# Patient Record
Sex: MALE | Race: WHITE | URBAN - METROPOLITAN AREA
[De-identification: names, ages, dates, MRNs, and addresses within clinical notes are randomized per-mention and may not be internally consistent; named-entity substitution may affect disease eponyms.]

---

## 2017-03-17 ENCOUNTER — EMERGENCY (EMERGENCY)
Facility: HOSPITAL | Age: 37
LOS: 0 days | Discharge: HOME | End: 2017-03-17
Attending: EMERGENCY MEDICINE | Admitting: EMERGENCY MEDICINE
Payer: SELF-PAY

## 2017-03-17 VITALS
SYSTOLIC BLOOD PRESSURE: 134 MMHG | OXYGEN SATURATION: 100 % | HEART RATE: 114 BPM | DIASTOLIC BLOOD PRESSURE: 94 MMHG | HEIGHT: 75 IN | WEIGHT: 139.99 LBS | TEMPERATURE: 98 F

## 2017-03-17 VITALS — HEART RATE: 105 BPM

## 2017-03-17 DIAGNOSIS — K02.9 DENTAL CARIES, UNSPECIFIED: ICD-10-CM

## 2017-03-17 DIAGNOSIS — K08.9 DISORDER OF TEETH AND SUPPORTING STRUCTURES, UNSPECIFIED: ICD-10-CM

## 2017-03-17 PROCEDURE — 99283 EMERGENCY DEPT VISIT LOW MDM: CPT

## 2017-03-17 RX ORDER — PENICILLIN V POTASIUM 500 MG/1
1 TABLET OROPHARYNGEAL
Qty: 28 | Refills: 0
Start: 2017-03-17 | End: 2017-03-24

## 2017-03-17 RX ORDER — OXYCODONE AND ACETAMINOPHEN 5; 325 MG/1; MG/1
1 TABLET ORAL
Qty: 15 | Refills: 0
Start: 2017-03-17

## 2017-03-17 RX ORDER — IBUPROFEN 200 MG
600 TABLET ORAL ONCE
Qty: 0 | Refills: 0 | Status: COMPLETED | OUTPATIENT
Start: 2017-03-17 | End: 2017-03-17

## 2017-03-17 RX ORDER — PENICILLIN V POTASSIUM 250 MG
500 TABLET ORAL ONCE
Qty: 0 | Refills: 0 | Status: COMPLETED | OUTPATIENT
Start: 2017-03-17 | End: 2017-03-17

## 2017-03-17 RX ADMIN — Medication 600 MILLIGRAM(S): at 01:57

## 2017-03-17 RX ADMIN — Medication 500 MILLIGRAM(S): at 01:57

## 2017-03-17 NOTE — ED PROVIDER NOTE - ENMT, MLM
Airway patent, Nasal mucosa clear. Mouth with normal mucosa. Throat has no vesicles, no oropharyngeal exudates and uvula is midline. Left lower 1 molar + cavity, no gum tenderness, no drainage

## 2017-03-17 NOTE — ED PROVIDER NOTE - OBJECTIVE STATEMENT
35 y/o male presents to ED with c/o mouth/dental pain began today.  Pt has history of poor dentition.  He states he was due for root canal however did not obtain over insurance issues. Today presents with left lower jaw pain and swelling.   Denies fever, HA.

## 2017-03-17 NOTE — ED PROVIDER NOTE - NS ED MD SCRIBE ATTENDING SCRIBE SECTIONS
SCALES/DISPOSITION/PAST MEDICAL/SURGICAL/SOCIAL HISTORY/PROVIDER CARE INITIATION/PHYSICAL EXAM/PROGRESS NOTE/OBSERVATION MONITORING PLAN/CONSULTATIONS/SHIFT CHANGE/VITAL SIGNS( Pullset)/HISTORY OF PRESENT ILLNESS/REVIEW OF SYSTEMS/RESULTS/MANDATORY DOCUMENTATION/INTAKE ASSESSMENT/SCREENINGS/HIV

## 2017-10-02 NOTE — ED ADULT NURSE NOTE - CAS EDN DISCHARGE INTERVENTIONS
Was the patient seen in the last year in this department? Yes     Does patient have an active prescription for medications requested? No     Received Request Via: Patient     NA

## 2019-02-15 NOTE — ED PROVIDER NOTE - NSCAREINITIATED _GEN_ER
Lidya Rios(Attending)
Purpose of the nutrition education/Pt was educated on increased needs post-op. Discussed optimal nutrient dense foods high in protein. Pt was receptive and expressed understanding.

## 2020-04-06 ENCOUNTER — TRANSCRIPTION ENCOUNTER (OUTPATIENT)
Age: 40
End: 2020-04-06

## 2020-04-30 ENCOUNTER — MESSAGE (OUTPATIENT)
Age: 40
End: 2020-04-30

## 2020-05-06 PROBLEM — Z00.00 ENCOUNTER FOR PREVENTIVE HEALTH EXAMINATION: Status: ACTIVE | Noted: 2020-05-06

## 2022-04-04 NOTE — ED PROVIDER NOTE - DISCHARGE DATE
17-Mar-2017
Risk Assessment Explanation (Does Not Render In The Note): Clinical determination of the probability and/or consequences of an event, such as surgery. Clinical assessment of the level of risk is affected by the nature of the event under consideration for the patient. Modifier 57 is used to indicate an Evaluation and Management (E/M) service resulted in the initial decision to perform surgery either the day before a major surgery (90 day global) or the day of a major surgery.
Identified Risk Factors Documented?: yes
Complexity (Necessary For Coding; Major - 90 Day Global With Some Exceptions; Minor - 10 Day Global): major
Date Of Surgery - Today Or Tomorrow?: today
Discussion: Risks, benefits and alternative treatment options discussed with patient as outlined below:\\n\\n

## 2024-06-11 ENCOUNTER — EMERGENCY (EMERGENCY)
Facility: HOSPITAL | Age: 44
LOS: 0 days | Discharge: HOME | End: 2024-06-11
Attending: EMERGENCY MEDICINE
Payer: COMMERCIAL

## 2024-06-11 VITALS
TEMPERATURE: 98 F | HEART RATE: 65 BPM | DIASTOLIC BLOOD PRESSURE: 89 MMHG | OXYGEN SATURATION: 97 % | SYSTOLIC BLOOD PRESSURE: 121 MMHG | RESPIRATION RATE: 16 BRPM | WEIGHT: 179.24 LBS

## 2024-06-11 VITALS
RESPIRATION RATE: 18 BRPM | DIASTOLIC BLOOD PRESSURE: 69 MMHG | OXYGEN SATURATION: 97 % | HEART RATE: 63 BPM | SYSTOLIC BLOOD PRESSURE: 112 MMHG | TEMPERATURE: 98 F

## 2024-06-11 DIAGNOSIS — M54.50 LOW BACK PAIN, UNSPECIFIED: ICD-10-CM

## 2024-06-11 DIAGNOSIS — M51.26 OTHER INTERVERTEBRAL DISC DISPLACEMENT, LUMBAR REGION: ICD-10-CM

## 2024-06-11 DIAGNOSIS — F17.200 NICOTINE DEPENDENCE, UNSPECIFIED, UNCOMPLICATED: ICD-10-CM
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
## 2024-06-11 DIAGNOSIS — S39.012A STRAIN OF MUSCLE, FASCIA AND TENDON OF LOWER BACK, INITIAL ENCOUNTER: ICD-10-CM

## 2024-06-11 PROCEDURE — 72148 MRI LUMBAR SPINE W/O DYE: CPT | Mod: 26,MC

## 2024-06-11 PROCEDURE — 99284 EMERGENCY DEPT VISIT MOD MDM: CPT | Mod: 25

## 2024-06-11 PROCEDURE — 72131 CT LUMBAR SPINE W/O DYE: CPT | Mod: MC

## 2024-06-11 PROCEDURE — 72148 MRI LUMBAR SPINE W/O DYE: CPT | Mod: MC

## 2024-06-11 PROCEDURE — 99282 EMERGENCY DEPT VISIT SF MDM: CPT

## 2024-06-11 PROCEDURE — 99284 EMERGENCY DEPT VISIT MOD MDM: CPT

## 2024-06-11 PROCEDURE — 72131 CT LUMBAR SPINE W/O DYE: CPT | Mod: 26,MC

## 2024-06-11 RX ORDER — IBUPROFEN 200 MG
600 TABLET ORAL ONCE
Refills: 0 | Status: COMPLETED | OUTPATIENT
Start: 2024-06-11 | End: 2024-06-11

## 2024-06-11 RX ORDER — CYCLOBENZAPRINE HYDROCHLORIDE 10 MG/1
1 TABLET, FILM COATED ORAL
Qty: 12 | Refills: 0
Start: 2024-06-11 | End: 2024-06-14

## 2024-06-11 RX ORDER — LIDOCAINE 4 G/100G
1 CREAM TOPICAL ONCE
Refills: 0 | Status: COMPLETED | OUTPATIENT
Start: 2024-06-11 | End: 2024-06-11

## 2024-06-11 RX ORDER — CYCLOBENZAPRINE HYDROCHLORIDE 10 MG/1
10 TABLET, FILM COATED ORAL ONCE
Refills: 0 | Status: COMPLETED | OUTPATIENT
Start: 2024-06-11 | End: 2024-06-11

## 2024-06-11 RX ORDER — DEXAMETHASONE 0.5 MG/5ML
10 ELIXIR ORAL ONCE
Refills: 0 | Status: COMPLETED | OUTPATIENT
Start: 2024-06-11 | End: 2024-06-11

## 2024-06-11 RX ORDER — ACETAMINOPHEN 500 MG
650 TABLET ORAL ONCE
Refills: 0 | Status: COMPLETED | OUTPATIENT
Start: 2024-06-11 | End: 2024-06-11

## 2024-06-11 RX ADMIN — CYCLOBENZAPRINE HYDROCHLORIDE 10 MILLIGRAM(S): 10 TABLET, FILM COATED ORAL at 11:13

## 2024-06-11 RX ADMIN — Medication 650 MILLIGRAM(S): at 11:14

## 2024-06-11 RX ADMIN — Medication 600 MILLIGRAM(S): at 11:14

## 2024-06-11 RX ADMIN — Medication 10 MILLIGRAM(S): at 11:31

## 2024-06-11 RX ADMIN — LIDOCAINE 1 PATCH: 4 CREAM TOPICAL at 11:31

## 2024-06-11 NOTE — ED STATDOCS - OBJECTIVE STATEMENT
43-year-old male presents the emergency department with lower back pain.  Patient states he was moving a tree stump yesterday when he felt a pop in his lower back.  Since then has been having difficulty walking with pain and spasms when trying to walk.  Patient states when he sits still he does not have the pain.  No numbness in his groin no urinary or fecal incontinence did not take anything for symptoms.  States that he tried to walk today and had to crawl to the bathroom because of the discomfort so came in for evaluation.  Exam with no midline spinal tenderness he has some bilateral lumbar tenderness to palpation no saddle anesthesia no foot drop.  Likely lumbar muscle spasms from herniated disc.  No signs of cord compression.  Will symptom control patient will need spine follow-up.

## 2024-06-11 NOTE — CONSULT NOTE ADULT - SUBJECTIVE AND OBJECTIVE BOX
CHIEF COMPLAINT:     HPI: Pt is a 43y s/p back strain while lifting a tree stump and then twisting yesterday. He was able to get through the night but this AM felt a little worse. He was crawling to the bathroom. Denies leg weakness or tingling. In ED pt complaining of back pain. MRI shows disc at L5-S1. No past trauma, no recent or past back pain.  No alleviating factors including medication or positioning. He is feeling a little better after dexamethasone and Motrin he received here.  He is Able to walk after incident and today.  Denies pain down legs, denies paresthesias, denies incontinence of bowel or bladder.     ROS: No CP, no SOB, No night sweats, no fevers or chills, no Numbness or tingling.    PAST MEDICAL & SURGICAL HISTORY:  No pertinent past medical history          FAMILY HISTORY:      SOCIAL HISTORY:     Vital Signs Last 24 Hrs  T(C): 36.8 (11 Jun 2024 15:33), Max: 36.8 (11 Jun 2024 15:33)  T(F): 98.3 (11 Jun 2024 15:33), Max: 98.3 (11 Jun 2024 15:33)  HR: 60 (11 Jun 2024 15:33) (60 - 65)  BP: 111/72 (11 Jun 2024 15:33) (111/72 - 121/89)  BP(mean): 84 (11 Jun 2024 15:33) (84 - 99)  RR: 18 (11 Jun 2024 15:33) (16 - 18)  SpO2: 96% (11 Jun 2024 15:33) (96% - 97%)    Parameters below as of 11 Jun 2024 15:33  Patient On (Oxygen Delivery Method): room air      I&O's Detail      LABS:    RADIOLOGY & ADDITIONAL STUDIES  < from: MR Lumbar Spine No Cont (06.11.24 @ 14:06) >    Mild straightening of the lumbar lordosis. Normal alignment of the lumbar   vertebrae. The vertebral bodies have the appropriate height and MR   signal. Reduced height and T2 signal of the L4-S1 intervertebral discs,   compatible with disc degeneration.    The conus tapers normally to the level of L1. The cauda equina is within   normal limits.    T12-L4: No large disc bulge. The bilateral neuroforamina are largely   patent. No ursula narrowing of the spinal canal.    L4-L5: Disc bulge with annular fissure. The bilateral neuroforamina are   patent. No significant narrowing of the spinal canal. Mild facet   arthropathy.    L5-S1: Disc bulge with extrusion oriented inferiorly measuring   approximately 0.8 x 1.2 x 2.5 cm (AP x TV x CC). This results in severe   narrowing of the right lateral recess with mild mass effect on the thecal   sac. The bilateral neuroforamina are patent. No narrowing of the spinal   canal.    The paraspinal muscles are unremarkable. Visualized portions of the   intra-abdominal viscera are within normal limits.    IMPRESSION:    1.  No evidence of critical narrowing of the spinal canal.  2.  Degenerative changes in the lower lumbar spine including a inferior   oriented disc extrusion at the L5-S1 level as above.      < end of copied text >    PHYSICAL EXAM:  Physical Exam:  General: NAD, Alert, Awake and oriented  Neurologic: No gross deficits, moving all 4s.  Head: NCAT without abrasions, lacerations, or ecchymosis to head, face, or scalp  Eyes: PERRL  Heart: Pulse is regular and palpable  Respiratory: Nonlabored. Symmetric chest wall expansion bilaterally, no accessory muscle use  Abdomen: Soft, Nontender, no guarding.  LE: No Edema    Musculoskeletal:      SPINE:  C-Spine/Neck: supple, NT, Full Painless baseline AROM  T/L Spine: No palpable step-off. No Bony TTP. + lumbar Paraspinal Tenderness  Neuro:     LOWER extremities:  Sensation: intact to light touch and symmetric bilaterally   Strength: Felt to be Symmetric Bilaterally IP, Quadriceps, Hamstrg, EHL, FHL, TA, GS 5/5        RIGHT UE:Full baseline painless ROM at shoulder, elbow, wrist    LEFT UE: Full baseline painless ROM at shoulder, elbow, wrist    RIGHT LE:  Full baseline painless ROM at hip, knee, ankle. Able to actively SLR. Plantar dorsiflexion 5/5.     LEFT LE: Full baseline painless ROM at hip, knee, ankle. Able to actively SLR. Plantar dorsiflexion 5/5.

## 2024-06-11 NOTE — ED STATDOCS - PROGRESS NOTE DETAILS
Pt. is a 43 year old male no significant medical history, presents with LBP.  Pt. was moving a heavy tree stump yesterday and felt a pp in the lower back.  t. having difficulty walking due ot pain.  NO pain at rest but pain with standing.  Pt. only able to crawl.  No urinary or fecal incontinence or saddle anesthesia.  Probable disc herniation.  Nisreen Steven PA-C Plan for pain management.  CT.  Will refer to spine. Nisreen Steven PA-C MRI demonstrating disc herniation.  Pt. evaluated by orthopedic team after consulting with Dr. Reyes, orthopedic spine surgeon.   DC with Prednisone and Flexeril.  Return precautions discussed.  Nisreen Steven PA-C

## 2024-06-11 NOTE — ED STATDOCS - PHYSICAL EXAMINATION
Constitutional: NAD AAOx3  Eyes: PERRLA EOMI  Head: Normocephalic atraumatic  Mouth: MMM  Cardiac: regular rate   Resp: unlabored breathing  GI: Abd s/nt/nd  MSK:  no midline spinal tenderness. +bilateral lumbar tenderness to palpation. no saddle anesthesia. no foot drop  Neuro: grossly normal and intact  Skin: No visible rashes Constitutional: mild distress AAOx3  Eyes: PERRLA EOMI  Head: Normocephalic atraumatic  Mouth: MMM  Cardiac: regular rate   Resp: unlabored breathing clear b/l   GI: Abd s/nt/nd  MSK:  no midline spinal tenderness. +bilateral lumbar tenderness to palpation. no saddle anesthesia. no foot drop  Neuro: grossly normal and intact  Skin: No visible rashes

## 2024-06-11 NOTE — ED STATDOCS - ATTENDING APP SHARED VISIT CONTRIBUTION OF CARE
I, Chase Zuleta MD, personally saw the patient with ACP.  I have personally performed a face to face diagnostic evaluation on this patient.  I have reviewed the ACP note and agree with the history, exam, and plan of care, except as noted. I personally made/approved the management plan and take responsibility for the patient management   The initial assessment was performed by myself and then the patient was handed off to the ACP. The patient was followed and re-evaluated by the ACP. All labs, imaging and procedures were evaluated and performed by the ACP and I was available for consultation if any questions in the patients care came up.   I personally made/approved the management plan and take responsibility for the patient management.

## 2024-06-11 NOTE — ED STATDOCS - NSFOLLOWUPINSTRUCTIONS_ED_ALL_ED_FT
Herniated Disk    A herniated disk, also called a ruptured disk or slipped disk, occurs when a disk in the spine bulges out too far. Between the bones in the spine (vertebrae), there are oval disks that are made of a soft, spongy center filled with liquid that is surrounded by a tough outer ring. The disks connect the vertebrae, help the spine move, and keep the bones from rubbing against each other when you move.    When you have a herniated disk, the spongy center of the disk bulges out or breaks through the outer ring. The spongy center can press on a nerve between the vertebrae and cause pain. This can occur anywhere in the back or neck area, but the lower back is most commonly affected.    What are the causes?  This condition may be caused by:  Age-related wear and tear.  Sudden injury, such as a strain or sprain.  What increases the risk?  The following factors may make you more likely to develop this condition:  Aging. This is the main risk factor for a herniated disk.  Being a man who is 30–50 years old.  Frequently doing activities that involve heavy lifting, bending, or twisting.  Not getting enough exercise.  Being overweight.  Using tobacco products.  What are the signs or symptoms?  Symptoms may vary depending on where your herniated disk is located.  A herniated disk in the lower back may cause:  Sharp pain in part of the hips, buttocks, or legs.  Sharp pain in the lower back, spreading down through the leg into the foot (sciatica).  A herniated disk in the neck may cause dizziness and vertigo. It may also cause pain or weakness in the neck, shoulder, or upper arm, forearm, or fingers.  You may also have muscle weakness. You may have trouble:  Lifting your leg or arm.  Standing on your toes.  Squeezing tightly with one of your hands.  Other symptoms may include:  Numbness or tingling in the affected areas of the hands, arms, feet, or legs.  Inability to control when you urinate or when you have bowel movements. This is a rare but serious sign of a severe herniated disk in the lower back.  How is this diagnosed?  This condition may be diagnosed based on:  Your symptoms.  Your medical history.  A physical exam. The exam may include:  A straight-leg test. For this test, you will lie on your back while your health care provider lifts your leg, keeping your knee straight. If you feel pain, you likely have a herniated disk.  Neurologic tests. These include checking for numbness, reflexes, muscle strength, and problems with posture.  Imaging tests, such as:  X-rays.  MRI.  CT scan.  Electromyogram (EMG) to check the nerves that control muscles.  How is this treated?  Treatment for this condition may include:  A period of light, painless activity for a few days to several weeks. Complete bed rest is not recommended.  If you have a herniated disk in your lower back, avoid sitting as it increases pressure on the disk.  Medicines. These may include:  NSAIDs, such as ibuprofen, to help reduce pain and swelling.  Muscle relaxants to prevent sudden tightening of the back muscles (back spasms).  Prescription pain medicines, if you have severe pain.  Ice or heat therapy.  Steroid injections in the area of the herniated disk. These can help reduce pain and swelling.  Physical therapy to strengthen your back muscles.  In many cases, symptoms go away with treatment over a period of days or weeks. You will most likely be free of symptoms after 3–4 months. If other treatments do not help to relieve your symptoms, you may need surgery.    Follow these instructions at home:  Medicines    Take over-the-counter and prescription medicines only as told by your health care provider.  Ask your health care provider if the medicine prescribed to you:  Requires you to avoid driving or using heavy machinery.  Can cause constipation. You may need to take these actions to prevent or treat constipation:  Drink enough fluid to keep your urine pale yellow.  Take over-the-counter or prescription medicines.  Eat foods that are high in fiber, such as beans, whole grains, and fresh fruits and vegetables.  Limit foods that are high in fat and processed sugars, such as fried or sweet foods.  Managing pain, stiffness, and swelling        If directed, put ice on the painful area. Icing can help to relieve pain. To do this:  Put ice in a plastic bag.  Place a towel between your skin and the bag.  Leave the ice on for 20 minutes, 2–3 times a day.  Remove the ice if your skin turns bright red. This is very important. If you cannot feel pain, heat, or cold, you have a greater risk of damage to the area.  If directed, apply heat to the painful area as often as told by your health care provider. Heat can reduce the stiffness of your muscles. Use the heat source that your health care provider recommends, such as a moist heat pack or a heating pad.  Place a towel between your skin and the heat source.  Leave the heat on for 20–30 minutes.  Remove the heat if your skin turns bright red. This is especially important if you are unable to feel pain, heat, or cold. You may have a greater risk of getting burned.  Activity    Avoid strict bed rest but only do activities that are painless.  Gradually return to your normal activities and exercise as told by your health care provider. Ask your health care provider what activities and exercises are safe for you.  Use good posture.  Avoid movements that cause pain.  Do not lift anything that is heavier than 10 lb (4.5 kg), or the limit that you are told, until your health care provider says that it is safe.  Do not sit or stand for long periods of time without changing positions.  Do not sit for long periods of time without getting up and moving around.  If physical therapy was prescribed, do exercises as told by your health care provider.  Aim to strengthen muscles in your back and abdomen with exercises such as swimming or walking.  General instructions    Do not use any products that contain nicotine or tobacco, such as cigarettes, e-cigarettes, and chewing tobacco. These products can delay healing. If you need help quitting, ask your health care provider.  Do not wear high-heeled shoes.  Do not sleep on your abdomen.  If you are overweight, work with your health care provider to lose weight safely.  Keep all follow-up visits. This is important.  How is this prevented?  Maintain a healthy weight.  Maintain physical fitness. Do at least 150 minutes of moderate-intensity exercise each week, such as brisk walking or water aerobics.  When lifting objects:  Keep your feet at least shoulder-width apart and tighten the muscles of your abdomen.  Keep your spine neutral as you bend your knees and hips. It is important to lift using the strength of your legs, not your back. Do not lock your knees straight out.  Always ask for help to lift heavy or awkward objects.  Contact a health care provider if you:  Have back pain or neck pain that does not get better after 6 weeks.  Have severe pain in your back, neck, legs, or arms.  Develop numbness, tingling, or weakness anywhere in your body.  Get help right away if:  You cannot move your arms or legs.  You cannot control when you urinate or have bowel movements.  You feel dizzy or you faint.  You have shortness of breath.  These symptoms may represent a serious problem that is an emergency. Do not wait to see if the symptoms will go away. Get medical help right away. Call your local emergency services (911 in the [place] Do not drive yourself to the hospital.    Summary  A herniated disk, also called a ruptured disk or slipped disk, occurs when a disk in the spine bulges out too far (herniates).  This condition may be caused by age-related wear and tear or a sudden injury.  Symptoms may vary depending on where your herniated disk is located.  Treatment may include rest, medicines, ice or heat therapy, steroid injections, and physical therapy.  If other treatments do not help to relieve your symptoms, you may need surgery.  This information is not intended to replace advice given to you by your health care provider. Make sure you discuss any questions you have with your health care provider.

## 2024-06-11 NOTE — ED STATDOCS - CARE PROVIDER_API CALL
Sergio Reyes  Orthopaedic Surgery  [address] [facility], [address] [address]  Phone: [phone]  Fax: [phone]  Follow Up Time:

## 2024-06-11 NOTE — ED STATDOCS - NS_ ATTENDINGSCRIBEDETAILS _ED_A_ED_FT
I, Chase Zuleta MD,  performed the initial face to face bedside interview with this patient regarding history of present illness, review of symptoms and relevant past medical, social and family history.  I completed an independent physical examination.  I was the initial provider who evaluated this patient.   I personally saw the patient and performed a substantive portion of the visit including all aspects of the medical decision making.  The history, relevant review of systems, past medical and surgical history, medical decision making, and physical examination was documented by the scribe in my presence and I attest to the accuracy of the documentation.

## 2024-06-11 NOTE — ED ADULT TRIAGE NOTE - CHIEF COMPLAINT QUOTE
Patient wheeled into triage c/o lower back pain. Pt lifted something yesterday and twisted his back, hoped sleep would fix it, but it did not. says his legs are starting to get numb.

## 2024-06-11 NOTE — CONSULT NOTE ADULT - ASSESSMENT
Lumbar Strain, muscle spasm secondary flexion/twisting injury under load.  -Antiinflammatories  -Muscle relaxant prn  -No lifting  -Walking ok and avoid spine flexion  -ICE/Heat pack alternating  -FU DR Reyes in office in about a week  -Return if any worsening change in bowel/bladder, leg numbness or weakness, This was discussed in detail with pt including the nature of the injury  Discussed with directed by DR Reyes

## 2024-06-11 NOTE — ED STATDOCS - PATIENT PORTAL LINK FT
You can access the FollowMyHealth Patient Portal offered by [facility] by registering at the following website: [email or web address]. By joining [facility]’s FollowMyHealth portal, you will also be able to view your health information using other applications (apps) compatible with our system.

## 2024-06-11 NOTE — ED STATDOCS - CLINICAL SUMMARY MEDICAL DECISION MAKING FREE TEXT BOX
43-year-old male presents the emergency department with lower back pain.  Patient states he was moving a tree stump yesterday when he felt a pop in his lower back.  Since then has been having difficulty walking with pain and spasms when trying to walk.  Patient states when he sits still he does not have the pain.  No numbness in his groin no urinary or fecal incontinence did not take anything for symptoms.  States that he tried to walk today and had to crawl to the bathroom because of the discomfort so came in for evaluation.  Exam with no midline spinal tenderness he has some bilateral lumbar tenderness to palpation no saddle anesthesia no foot drop.  Likely lumbar muscle spasms from herniated disc.  No signs of cord compression.  Will symptom control patient will need spine follow-up. 43-year-old male presents the emergency department with lower back pain.  Patient states he was moving a tree stump yesterday when he felt a pop in his lower back.  Since then has been having difficulty walking with pain and spasms when trying to walk.  Patient states when he sits still he does not have the pain.  No numbness in his groin no urinary or fecal incontinence did not take anything for symptoms.  States that he tried to walk today and had to crawl to the bathroom because of the discomfort so came in for evaluation.  Exam with no midline spinal tenderness he has some bilateral lumbar tenderness to palpation no saddle anesthesia no foot drop.  Likely lumbar muscle spasms from herniated disc.  No signs of cord compression.  Will symptom control patient will need spine follow-up.    MRI demonstrating disc herniation.  Pt. evaluated by orthopedic team after consulting with Dr. Reyes, orthopedic spine surgeon.   DC with Prednisone and Flexeril.  Return precautions discussed.  Nisreen Steven PA-C

## 2024-06-17 ENCOUNTER — TRANSCRIPTION ENCOUNTER (OUTPATIENT)
Age: 44
End: 2024-06-17

## 2024-06-17 ENCOUNTER — INPATIENT (INPATIENT)
Facility: HOSPITAL | Age: 44
LOS: 0 days | Discharge: HOME | DRG: 552 | End: 2024-06-17
Attending: HOSPITALIST | Admitting: INTERNAL MEDICINE
Payer: COMMERCIAL

## 2024-06-17 VITALS
RESPIRATION RATE: 18 BRPM | HEART RATE: 112 BPM | OXYGEN SATURATION: 97 % | TEMPERATURE: 98 F | SYSTOLIC BLOOD PRESSURE: 114 MMHG | WEIGHT: 180.34 LBS | DIASTOLIC BLOOD PRESSURE: 84 MMHG

## 2024-06-17 VITALS
HEART RATE: 79 BPM | TEMPERATURE: 98 F | RESPIRATION RATE: 18 BRPM | OXYGEN SATURATION: 97 % | DIASTOLIC BLOOD PRESSURE: 80 MMHG | SYSTOLIC BLOOD PRESSURE: 146 MMHG

## 2024-06-17 DIAGNOSIS — M54.9 DORSALGIA, UNSPECIFIED: ICD-10-CM

## 2024-06-17 LAB
ALBUMIN SERPL ELPH-MCNC: 3.5 G/DL — SIGNIFICANT CHANGE UP (ref 3.3–5)
ALP SERPL-CCNC: 87 U/L — SIGNIFICANT CHANGE UP (ref 40–120)
ALT FLD-CCNC: 60 U/L — SIGNIFICANT CHANGE UP (ref 12–78)
ANION GAP SERPL CALC-SCNC: 9 MMOL/L — SIGNIFICANT CHANGE UP (ref 5–17)
APTT BLD: 26.9 SEC — SIGNIFICANT CHANGE UP (ref 24.5–35.6)
AST SERPL-CCNC: 50 U/L — HIGH (ref 15–37)
BASOPHILS # BLD AUTO: 0.04 K/UL — SIGNIFICANT CHANGE UP (ref 0–0.2)
BASOPHILS NFR BLD AUTO: 0.5 % — SIGNIFICANT CHANGE UP (ref 0–2)
BILIRUB SERPL-MCNC: 0.3 MG/DL — SIGNIFICANT CHANGE UP (ref 0.2–1.2)
BUN SERPL-MCNC: 15 MG/DL — SIGNIFICANT CHANGE UP (ref 7–23)
CALCIUM SERPL-MCNC: 9 MG/DL — SIGNIFICANT CHANGE UP (ref 8.5–10.1)
CHLORIDE SERPL-SCNC: 105 MMOL/L — SIGNIFICANT CHANGE UP (ref 96–108)
CO2 SERPL-SCNC: 25 MMOL/L — SIGNIFICANT CHANGE UP (ref 22–31)
CREAT SERPL-MCNC: 0.91 MG/DL — SIGNIFICANT CHANGE UP (ref 0.5–1.3)
EGFR: 108 ML/MIN/1.73M2 — SIGNIFICANT CHANGE UP
EOSINOPHIL # BLD AUTO: 0.08 K/UL — SIGNIFICANT CHANGE UP (ref 0–0.5)
EOSINOPHIL NFR BLD AUTO: 1 % — SIGNIFICANT CHANGE UP (ref 0–6)
GLUCOSE SERPL-MCNC: 110 MG/DL — HIGH (ref 70–99)
HCT VFR BLD CALC: 39.6 % — SIGNIFICANT CHANGE UP (ref 39–50)
HGB BLD-MCNC: 14 G/DL — SIGNIFICANT CHANGE UP (ref 13–17)
IMM GRANULOCYTES NFR BLD AUTO: 2 % — HIGH (ref 0–0.9)
INR BLD: 0.81 RATIO — LOW (ref 0.85–1.18)
LYMPHOCYTES # BLD AUTO: 3.25 K/UL — SIGNIFICANT CHANGE UP (ref 1–3.3)
LYMPHOCYTES # BLD AUTO: 40.8 % — SIGNIFICANT CHANGE UP (ref 13–44)
MCHC RBC-ENTMCNC: 30.8 PG — SIGNIFICANT CHANGE UP (ref 27–34)
MCHC RBC-ENTMCNC: 35.4 GM/DL — SIGNIFICANT CHANGE UP (ref 32–36)
MCV RBC AUTO: 87 FL — SIGNIFICANT CHANGE UP (ref 80–100)
MONOCYTES # BLD AUTO: 0.64 K/UL — SIGNIFICANT CHANGE UP (ref 0–0.9)
MONOCYTES NFR BLD AUTO: 8 % — SIGNIFICANT CHANGE UP (ref 2–14)
NEUTROPHILS # BLD AUTO: 3.8 K/UL — SIGNIFICANT CHANGE UP (ref 1.8–7.4)
NEUTROPHILS NFR BLD AUTO: 47.7 % — SIGNIFICANT CHANGE UP (ref 43–77)
PLATELET # BLD AUTO: 337 K/UL — SIGNIFICANT CHANGE UP (ref 150–400)
POTASSIUM SERPL-MCNC: 3.6 MMOL/L — SIGNIFICANT CHANGE UP (ref 3.5–5.3)
POTASSIUM SERPL-SCNC: 3.6 MMOL/L — SIGNIFICANT CHANGE UP (ref 3.5–5.3)
PROT SERPL-MCNC: 6.9 GM/DL — SIGNIFICANT CHANGE UP (ref 6–8.3)
PROTHROM AB SERPL-ACNC: 9.2 SEC — LOW (ref 9.5–13)
RBC # BLD: 4.55 M/UL — SIGNIFICANT CHANGE UP (ref 4.2–5.8)
RBC # FLD: 13 % — SIGNIFICANT CHANGE UP (ref 10.3–14.5)
SODIUM SERPL-SCNC: 139 MMOL/L — SIGNIFICANT CHANGE UP (ref 135–145)
WBC # BLD: 7.97 K/UL — SIGNIFICANT CHANGE UP (ref 3.8–10.5)
WBC # FLD AUTO: 7.97 K/UL — SIGNIFICANT CHANGE UP (ref 3.8–10.5)

## 2024-06-17 PROCEDURE — 74177 CT ABD & PELVIS W/CONTRAST: CPT | Mod: 26,MC

## 2024-06-17 PROCEDURE — 99236 HOSP IP/OBS SAME DATE HI 85: CPT

## 2024-06-17 PROCEDURE — 72146 MRI CHEST SPINE W/O DYE: CPT | Mod: MC

## 2024-06-17 PROCEDURE — 72148 MRI LUMBAR SPINE W/O DYE: CPT | Mod: MC

## 2024-06-17 PROCEDURE — 72148 MRI LUMBAR SPINE W/O DYE: CPT | Mod: 26

## 2024-06-17 PROCEDURE — 99285 EMERGENCY DEPT VISIT HI MDM: CPT

## 2024-06-17 PROCEDURE — 72146 MRI CHEST SPINE W/O DYE: CPT | Mod: 26

## 2024-06-17 RX ORDER — LIDOCAINE HCL 28 MG/G
1 GEL TOPICAL
Qty: 0 | Refills: 0 | DISCHARGE
Start: 2024-06-17

## 2024-06-17 RX ORDER — OXYCODONE AND ACETAMINOPHEN 5; 325 MG/1; MG/1
1 TABLET ORAL
Qty: 18 | Refills: 0
Start: 2024-06-17 | End: 2024-06-19

## 2024-06-17 RX ORDER — METHOCARBAMOL 500 MG
1 TABLET ORAL
Qty: 30 | Refills: 0
Start: 2024-06-17 | End: 2024-06-26

## 2024-06-17 RX ORDER — METHOCARBAMOL 500 MG
750 TABLET ORAL THREE TIMES A DAY
Refills: 0 | Status: DISCONTINUED | OUTPATIENT
Start: 2024-06-17 | End: 2024-06-17

## 2024-06-17 RX ORDER — MAGNESIUM, ALUMINUM HYDROXIDE 400-400
30 TABLET,CHEWABLE ORAL EVERY 4 HOURS
Refills: 0 | Status: DISCONTINUED | OUTPATIENT
Start: 2024-06-17 | End: 2024-06-17

## 2024-06-17 RX ORDER — PANTOPRAZOLE SODIUM 40 MG/10ML
40 INJECTION, POWDER, FOR SOLUTION INTRAVENOUS
Refills: 0 | Status: DISCONTINUED | OUTPATIENT
Start: 2024-06-17 | End: 2024-06-17

## 2024-06-17 RX ORDER — DEXAMETHASONE 1 MG/1
1 TABLET ORAL
Qty: 20 | Refills: 0
Start: 2024-06-17 | End: 2024-06-21

## 2024-06-17 RX ORDER — DEXAMETHASONE 1 MG/1
2 TABLET ORAL EVERY 6 HOURS
Refills: 0 | Status: DISCONTINUED | OUTPATIENT
Start: 2024-06-17 | End: 2024-06-17

## 2024-06-17 RX ORDER — MORPHINE SULFATE 100 MG/1
2 TABLET, EXTENDED RELEASE ORAL ONCE
Refills: 0 | Status: DISCONTINUED | OUTPATIENT
Start: 2024-06-17 | End: 2024-06-17

## 2024-06-17 RX ORDER — MORPHINE SULFATE 100 MG/1
2 TABLET, EXTENDED RELEASE ORAL EVERY 4 HOURS
Refills: 0 | Status: DISCONTINUED | OUTPATIENT
Start: 2024-06-17 | End: 2024-06-17

## 2024-06-17 RX ORDER — LIDOCAINE HCL 28 MG/G
1 GEL TOPICAL DAILY
Refills: 0 | Status: DISCONTINUED | OUTPATIENT
Start: 2024-06-17 | End: 2024-06-17

## 2024-06-17 RX ORDER — PANTOPRAZOLE SODIUM 40 MG/10ML
1 INJECTION, POWDER, FOR SOLUTION INTRAVENOUS
Qty: 15 | Refills: 0
Start: 2024-06-17 | End: 2024-07-01

## 2024-06-17 RX ORDER — ACETAMINOPHEN 325 MG
650 TABLET ORAL EVERY 6 HOURS
Refills: 0 | Status: DISCONTINUED | OUTPATIENT
Start: 2024-06-17 | End: 2024-06-17

## 2024-06-17 RX ORDER — SODIUM CHLORIDE 0.9 % (FLUSH) 0.9 %
1000 SYRINGE (ML) INJECTION ONCE
Refills: 0 | Status: COMPLETED | OUTPATIENT
Start: 2024-06-17 | End: 2024-06-17

## 2024-06-17 RX ORDER — ONDANSETRON HYDROCHLORIDE 2 MG/ML
4 INJECTION INTRAMUSCULAR; INTRAVENOUS EVERY 8 HOURS
Refills: 0 | Status: DISCONTINUED | OUTPATIENT
Start: 2024-06-17 | End: 2024-06-17

## 2024-06-17 RX ADMIN — MORPHINE SULFATE 2 MILLIGRAM(S): 100 TABLET, EXTENDED RELEASE ORAL at 05:54

## 2024-06-17 RX ADMIN — LIDOCAINE HCL 1 PATCH: 28 GEL TOPICAL at 11:29

## 2024-06-17 RX ADMIN — MORPHINE SULFATE 2 MILLIGRAM(S): 100 TABLET, EXTENDED RELEASE ORAL at 09:11

## 2024-06-17 RX ADMIN — DEXAMETHASONE 2 MILLIGRAM(S): 1 TABLET ORAL at 09:08

## 2024-06-17 RX ADMIN — Medication 750 MILLIGRAM(S): at 09:08

## 2024-06-17 RX ADMIN — DEXAMETHASONE 2 MILLIGRAM(S): 1 TABLET ORAL at 16:17

## 2024-06-17 RX ADMIN — Medication 1000 MILLILITER(S): at 04:38

## 2024-06-17 RX ADMIN — PANTOPRAZOLE SODIUM 40 MILLIGRAM(S): 40 INJECTION, POWDER, FOR SOLUTION INTRAVENOUS at 09:10

## 2024-06-17 RX ADMIN — MORPHINE SULFATE 2 MILLIGRAM(S): 100 TABLET, EXTENDED RELEASE ORAL at 04:39

## 2024-06-24 DIAGNOSIS — M51.27 OTHER INTERVERTEBRAL DISC DISPLACEMENT, LUMBOSACRAL REGION: ICD-10-CM

## 2024-06-24 DIAGNOSIS — M51.34 OTHER INTERVERTEBRAL DISC DEGENERATION, THORACIC REGION: ICD-10-CM

## 2024-06-24 DIAGNOSIS — Z88.0 ALLERGY STATUS TO PENICILLIN: ICD-10-CM

## 2024-06-24 DIAGNOSIS — Z79.52 LONG TERM (CURRENT) USE OF SYSTEMIC STEROIDS: ICD-10-CM

## 2024-06-25 ENCOUNTER — APPOINTMENT (OUTPATIENT)
Dept: NEUROSURGERY | Facility: CLINIC | Age: 44
End: 2024-06-25